# Patient Record
Sex: FEMALE | Race: WHITE | NOT HISPANIC OR LATINO | URBAN - NONMETROPOLITAN AREA
[De-identification: names, ages, dates, MRNs, and addresses within clinical notes are randomized per-mention and may not be internally consistent; named-entity substitution may affect disease eponyms.]

---

## 2017-01-17 ENCOUNTER — APPOINTMENT (RX ONLY)
Dept: URBAN - NONMETROPOLITAN AREA CLINIC 22 | Facility: CLINIC | Age: 27
Setting detail: DERMATOLOGY
End: 2017-01-17

## 2017-01-17 VITALS — HEIGHT: 66 IN | WEIGHT: 208 LBS

## 2017-01-17 DIAGNOSIS — L73.8 OTHER SPECIFIED FOLLICULAR DISORDERS: ICD-10-CM

## 2017-01-17 DIAGNOSIS — D485 NEOPLASM OF UNCERTAIN BEHAVIOR OF SKIN: ICD-10-CM

## 2017-01-17 DIAGNOSIS — D22 MELANOCYTIC NEVI: ICD-10-CM

## 2017-01-17 PROBLEM — L55.1 SUNBURN OF SECOND DEGREE: Status: ACTIVE | Noted: 2017-01-17

## 2017-01-17 PROBLEM — I10 ESSENTIAL (PRIMARY) HYPERTENSION: Status: ACTIVE | Noted: 2017-01-17

## 2017-01-17 PROBLEM — L85.3 XEROSIS CUTIS: Status: ACTIVE | Noted: 2017-01-17

## 2017-01-17 PROBLEM — D22.71 MELANOCYTIC NEVI OF RIGHT LOWER LIMB, INCLUDING HIP: Status: ACTIVE | Noted: 2017-01-17

## 2017-01-17 PROBLEM — D48.5 NEOPLASM OF UNCERTAIN BEHAVIOR OF SKIN: Status: ACTIVE | Noted: 2017-01-17

## 2017-01-17 PROBLEM — D22.5 MELANOCYTIC NEVI OF TRUNK: Status: ACTIVE | Noted: 2017-01-17

## 2017-01-17 PROBLEM — D22.62 MELANOCYTIC NEVI OF LEFT UPPER LIMB, INCLUDING SHOULDER: Status: ACTIVE | Noted: 2017-01-17

## 2017-01-17 PROCEDURE — 11100: CPT

## 2017-01-17 PROCEDURE — ? COUNSELING

## 2017-01-17 PROCEDURE — 99202 OFFICE O/P NEW SF 15 MIN: CPT | Mod: 25

## 2017-01-17 PROCEDURE — ? BIOPSY BY SHAVE METHOD

## 2017-01-17 ASSESSMENT — LOCATION ZONE DERM
LOCATION ZONE: LEG
LOCATION ZONE: TRUNK
LOCATION ZONE: FACE
LOCATION ZONE: ARM

## 2017-01-17 ASSESSMENT — LOCATION SIMPLE DESCRIPTION DERM
LOCATION SIMPLE: LEFT THIGH
LOCATION SIMPLE: CHEST
LOCATION SIMPLE: LEFT UPPER ARM
LOCATION SIMPLE: RIGHT FOREHEAD
LOCATION SIMPLE: RIGHT PRETIBIAL REGION

## 2017-01-17 ASSESSMENT — LOCATION DETAILED DESCRIPTION DERM
LOCATION DETAILED: LEFT ANTERIOR PROXIMAL UPPER ARM
LOCATION DETAILED: RIGHT FOREHEAD
LOCATION DETAILED: RIGHT LATERAL SUPERIOR CHEST
LOCATION DETAILED: RIGHT PROXIMAL PRETIBIAL REGION
LOCATION DETAILED: LEFT ANTERIOR DISTAL THIGH

## 2017-03-15 ENCOUNTER — APPOINTMENT (RX ONLY)
Dept: URBAN - NONMETROPOLITAN AREA CLINIC 22 | Facility: CLINIC | Age: 27
Setting detail: DERMATOLOGY
End: 2017-03-15

## 2017-03-15 PROCEDURE — ? PRODUCT LINE (MOISTURIZERS)

## 2017-03-15 PROCEDURE — 99211 OFF/OP EST MAY X REQ PHY/QHP: CPT

## 2017-03-15 NOTE — PROCEDURE: PRODUCT LINE (MOISTURIZERS)
Product 8 Price (In Dollars - Numeric Only, No Special Characters Or $): 11.66
Product 19 Price (In Dollars - Numeric Only, No Special Characters Or $): 0.00
Name Of Product 7: CeraVe Body
Product 12 Price (In Dollars - Numeric Only, No Special Characters Or $): 15.90
Product 38 Units: 0
Render Product Pricing In Note: Yes
Product 2 Application Directions: $5.00 + $.30 = $5.30\\n\\nHand cream was part of a package for a [employer] Special bundled with Cera Ve moisturizing cream.
Product 7 Application Directions: $15.00 + $.90 = $15.90\\n
Name Of Product 6: SkinCeuticals Triple Lipid Restore
Product 1 Application Directions: $10.00 + $.60 = $10.60\\n\\n
Product 11 Application Directions: $30.00 + $1.80= $31.80
Product 3 Price (In Dollars - Numeric Only, No Special Characters Or $): 12.72
Name Of Product 2: Cera Ve Hand Cream
Product 9 Application Directions: $62.00 + $3.72 = $65.72
Name Of Product 12: CeraVe Itch Relief Cream
Product 8 Application Directions: $11.00 + $0.66 = $11.66
Product 6 Price (In Dollars - Numeric Only, No Special Characters Or $): 106.00
Product 10 Application Directions: $38.00 + $2.28 = $40.28
Product 5 Price (In Dollars - Numeric Only, No Special Characters Or $): 10.60
Name Of Product 14: SkinMedica HA5
Product 3 Application Directions: $12.00 + $0.72 = $12.72 *2= $25.44
Detail Level: Zone
Name Of Product 13: SkinCeiticals Clarifying Cleanser
Product 11 Price (In Dollars - Numeric Only, No Special Characters Or $): 31.80
Product 13 Price (In Dollars - Numeric Only, No Special Characters Or $): 36.04
Name Of Product 9: SkinCeiticals Daily Moisture
Product 12 Application Directions: $15.00 + $0.90 = $15.90
Product 14 Application Directions: $178.00 + $10.60 = $188.60
Product 4 Application Directions: $10.00 + $0.60 = $10.60
Name Of Product 11: LipSmart
Product 15 Application Directions: $125.00 + $7.50 = $132.50
Product 13 Application Directions: $34.00 + $2.04 =$36.04
Name Of Product 8: CeraVe Hydrating Cleanser
Product 11 Units: 1
Product 9 Price (In Dollars - Numeric Only, No Special Characters Or $): 65.72
Product 2 Price (In Dollars - Numeric Only, No Special Characters Or $): 5.30
Product 14 Price (In Dollars - Numeric Only, No Special Characters Or $): 188.60
Name Of Product 3: Cera Ve SA
Product 15 Price (In Dollars - Numeric Only, No Special Characters Or $): 132.50
Name Of Product 10: SkinCeuticals LHA Cleanser
Product 6 Application Directions: $100.00 + $6.00 = $106.00\\n\\n$25 OFF Coupon\\n
Name Of Product 5: Cera Ve AM Face Lotion
Product 10 Price (In Dollars - Numeric Only, No Special Characters Or $): 40.28
Name Of Product 4: Cera Ve PM Face Lotion
Name Of Product 1: Cera Ve Moisturizing Cream
Product 5 Application Directions: $10.00 + $.60= $10.60